# Patient Record
Sex: MALE | Race: WHITE | URBAN - METROPOLITAN AREA
[De-identification: names, ages, dates, MRNs, and addresses within clinical notes are randomized per-mention and may not be internally consistent; named-entity substitution may affect disease eponyms.]

---

## 2018-10-11 PROBLEM — G47.00 INSOMNIA: Status: ACTIVE | Noted: 2018-10-11

## 2018-10-11 PROBLEM — E83.110 HEREDITARY HEMOCHROMATOSIS (HCC): Status: ACTIVE | Noted: 2018-10-11

## 2018-10-11 PROBLEM — M54.50 PAIN, LUMBAR REGION: Status: ACTIVE | Noted: 2018-10-11

## 2018-10-11 PROBLEM — I10 ESSENTIAL HYPERTENSION: Status: ACTIVE | Noted: 2018-10-11

## 2018-10-11 RX ORDER — TOPIRAMATE 50 MG/1
TABLET, FILM COATED ORAL 2 TIMES DAILY
COMMUNITY
End: 2019-10-16 | Stop reason: SDUPTHER

## 2018-10-11 RX ORDER — AMLODIPINE BESYLATE 5 MG/1
5 TABLET ORAL DAILY
COMMUNITY
End: 2018-10-12 | Stop reason: SDUPTHER

## 2018-10-12 ENCOUNTER — OFFICE VISIT (OUTPATIENT)
Dept: FAMILY MEDICINE CLINIC | Age: 33
End: 2018-10-12

## 2018-10-12 VITALS
TEMPERATURE: 97.6 F | OXYGEN SATURATION: 97 % | SYSTOLIC BLOOD PRESSURE: 122 MMHG | DIASTOLIC BLOOD PRESSURE: 82 MMHG | HEART RATE: 75 BPM | BODY MASS INDEX: 36.54 KG/M2 | WEIGHT: 261 LBS | HEIGHT: 71 IN

## 2018-10-12 DIAGNOSIS — E66.9 OBESITY (BMI 30-39.9): ICD-10-CM

## 2018-10-12 DIAGNOSIS — E83.110 HEREDITARY HEMOCHROMATOSIS (HCC): ICD-10-CM

## 2018-10-12 DIAGNOSIS — I10 ESSENTIAL HYPERTENSION: Primary | ICD-10-CM

## 2018-10-12 RX ORDER — AMLODIPINE BESYLATE 5 MG/1
5 TABLET ORAL DAILY
Qty: 90 TAB | Refills: 1 | Status: SHIPPED | OUTPATIENT
Start: 2018-10-12 | End: 2019-04-08 | Stop reason: SDUPTHER

## 2018-10-12 RX ORDER — TOPIRAMATE 25 MG/1
TABLET ORAL
Refills: 2 | COMMUNITY
Start: 2018-09-17

## 2018-10-12 NOTE — PROGRESS NOTES
Maximiliano Tang is a 35 y.o. male Chief Complaint Patient presents with  Blood Pressure Check 1. Have you been to the ER, urgent care clinic since your last visit? Hospitalized since your last visit?  no 
 
2. Have you seen or consulted any other health care providers outside of the [address] [place] [address] since your last visit? Include any pap smears or colon screening.    no

## 2018-10-12 NOTE — MR AVS SNAPSHOT
[address] 
[phone] Patient: Senthil Padilla MRN: [id number] :1985 Visit Information Date & Time Provider Department Dept. Phone Encounter #  
 10/12/2018  3:00 PM Александр Graham MD [facility] 144 [phone] [id number] Follow-up Instructions Return in about 1 month (around 2018). Upcoming Health Maintenance Date Due DTaP/Tdap/Td series (1 - Tdap) 2006 Influenza Age 5 to Adult 2018 Allergies as of 10/12/2018  Never Reviewed Severity Noted Reaction Type Reactions Codeine Phosphate  10/11/2018    Unknown (comments) Erythromycin  10/11/2018    Unknown (comments) Pcn [Penicillins]  10/11/2018    Unknown (comments) Current Immunizations  Never Reviewed No immunizations on file. Not reviewed this visit You Were Diagnosed With   
  
 Codes Comments Essential hypertension    -  Primary ICD-10-CM: I10 
ICD-9-CM: 401.9 Hered[facility]ochrom[facility] ([facility]     ICD-10-CM: E83.110 
ICD-9-CM: 275.01 Vitals BP Pulse Temp Height(growth percentile) Weight(growth percentile) SpO2  
 122/82 (BP 1 Location: Right arm, BP Patient Position: Sitting) 75 97.6 °F (36.4 °C) (Oral) 5' 11\" (1.803 m) 261 lb (118.4 kg) 97% BMI Smoking Status 36.4 kg/m2 Never Smoker BMI and BSA Data Body Mass Index Body Surface Area  
 36.4 kg/m 2 2.44 m 2 Preferred Pharmacy Pharmacy Name Phone [facility] [place] [facility] [phone] Your Updated Medication List  
  
   
This list is accurate as of 10/12/18  3:47 PM.  Always use your most recent med list. amLODIPine 5 mg tablet Commonly known as:  Michaelene Lacy Take 1 Tab by mouth daily. FLONASE NA  
by Nasal route. * topiramate 50 mg tablet Commonly known as:  TOPAMAX Take  by mouth two (2) times a day. * topiramate 25 mg tablet Commonly known as:  TOPAMAX TAKE 1 TABLET IN THE MORNING, 1 TAB AT LUNCH, AND 2 TABS AT BEDTIME  
  
 * Notice: This list has 2 medication(s) that are the same as other medications prescribed for you. Read the directions carefully, and ask your doctor or other care provider to review them with you. Prescriptions Sent to Pharmacy Refills  
 amLODIPine (NORVASC) 5 mg tablet 1 Sig: Take 1 Tab by mouth daily. Class: Normal  
 Pharmacy: [address] [place] [address] #: [phone] Route: Oral  
  
We Performed the Following CBC W/O DIFF [45646 CPT(R)] LIPID PANEL [90224 CPT(R)] METABOLIC PANEL, BASIC [77523 CPT(R)] SLEEP MEDICINE REFERRAL [NDD043 Custom] Comments:  
 Orders: 
Sleep Medicine Consult - Schedule patient for a sleep specialist consult. If appropriate, schedule patient for sleep study(s). Initiate treatment if needed. Forward correspondance to my office. Direct Referral for Study - Please arrange a sleep study consult only if sleep study is positive. Diagnostic polysomnogram - CPT 11625: Split Study if patient meets the criteria. Follow-up Instructions Return in about 1 month (around 11/12/2018). Referral Information Referral ID Referred By Referred To  
  
 [id number] Allie  [address] [facility], MD   
   [address] [address] Phone: [phone] Fax: [phone] Visits Status Start Date End Date 1 New Request 10/12/18 10/12/19 If your referral has a status of pending review or denied, additional information will be sent to support the outcome of this decision. Introducing [facility]! [facility] introduces [employer] patient portal. Now you can access parts of your medical record, email your doctor's office, and request medication refills online. 1. In your internet browser, go to [email or web address] 2. Click on the First Time User? Click Here link in the Sign In box. You will see the New Member Sign Up page. 3. Enter your Algore[employer] Access Code exactly as it appears below. You will not need to use this code after youve completed the sign-up process. If you do not sign up before the expiration date, you must request a new code. · Algore[employer] Access Code: [email or web address] Expires: 1/10/2019  2:56 PM 
 
4. Enter the last four digits of your Social Security Number (xxxx) and Date of Birth (mm/dd/yyyy) as indicated and click Submit. You will be taken to the next sign-up page. 5. Create a Mind[employer] ID. This will be your Algorego login ID and cannot be changed, so think of one that is secure and easy to remember. 6. Create a Algorego password. You can change your password at any time. 7. Enter your Password Reset Question and Answer. This can be used at a later time if you forget your password. 8. Enter your e-mail address. You will receive e-mail notification when new information is available in [address] 9. Click Sign Up. You can now view and download portions of your medical record. 10. Click the Download Summary menu link to download a portable copy of your medical information. If you have questions, please visit the Frequently Asked Questions section of the [employer] website. Remember, Al[employer]ore[employer] is NOT to be used for urgent needs. For medical emergencies, dial 911. Now available from your iPhone and Android! Please provide this summary of care documentation to your next provider. Your primary care clinician is listed as Saud Dickerson. If you have any questions after today's visit, please call [phone].

## 2018-10-12 NOTE — PROGRESS NOTES
[address] [place] [address] Subjective:  
Yulisa Scherer is a 35 y.o. male with history of HTN, Hemochromatosis, Obesity CC: Medication refill History provided by patient and chart review HPI: 
Darcie gentleman who presents to clinic for HTN med refills. Diagnosed with HTN about 6 months and started on Norvasc 5mg. Endorses medication compliance and denies any side effects. Does not check he Blood pressures at home. Working on exercising routinely and improving her diet. Denies any headaches, chest pain, palpitations, vision changes, LE edema, LE edema or orthopnea. Past Medical History:  
Diagnosis Date  Chronic sinusitis Allergies Allergen Reactions  Codeine Phosphate Unknown (comments)  Erythromycin Unknown (comments)  Pcn [Penicillins] Unknown (comments) Current Outpatient Prescriptions on File Prior to Visit Medication Sig Dispense Refill  amLODIPine (NORVASC) 5 mg tablet Take 5 mg by mouth daily.  topiramate (TOPAMAX) 50 mg tablet Take  by mouth two (2) times a day.  fluticasone propionate (FLONASE NA) by Nasal route. No current facility-administered medications on file prior to visit. Family History Problem Relation Age of Onset  No Known Problems Mother  No Known Problems Father  No Known Problems Sister  No Known Problems Brother Social History Social History  Marital status:  Spouse name: N/A  
 Number of children: N/A  
 Years of education: N/A Social History Main Topics  Smoking status: Never Smoker  Smokeless tobacco: Never Used  Alcohol use Yes  Drug use: No  
 Sexual activity: Yes Other Topics Concern  None Social History Narrative  None Past Surgical History:  
Procedure Laterality Date  HX APPENDECTOMY 2/09  HX OTHER SURGICAL Left thumb tendon repair 1/10 Patient Active Problem List  
Diagnosis Code  Essential hypertension I10  
  Hereditary hemochromatosis ([facility] E83.110  
 Insomnia G47.00  
 Pain, lumbar region M54.5 Review of Systems All other systems reviewed and are negative. Objective:  
 
Visit Vitals  /82 (BP 1 Location: Right arm, BP Patient Position: Sitting)  Pulse 75  Temp 97.6 °F (36.4 °C) (Oral)  Ht 5' 11\" (1.803 m)  Wt 261 lb (118.4 kg)  SpO2 97%  BMI 36.4 kg/m2 Physical Exam  
Constitutional: He is oriented to person, place, and time. AOX3, NAD HENT:  
Head: Normocephalic and atraumatic. Eyes: Conjunctivae and EOM are normal. Pupils are equal, round, and reactive to light. No scleral icterus. Neck: Normal range of motion. Neck supple. No JVD present. No thyromegaly present. Cardiovascular: Normal rate, regular rhythm, normal heart sounds and intact distal pulses. Exam reveals no gallop and no friction rub. No murmur heard. Pulmonary/Chest: Effort normal and breath sounds normal. No respiratory distress. Abdominal: Soft. Bowel sounds are normal. He exhibits no distension. There is no tenderness. Musculoskeletal: Normal range of motion. He exhibits no edema, tenderness or deformity. Neurological: He is alert and oriented to person, place, and time. No cranial nerve deficit. Coordination normal.  
Skin: Skin is warm and dry. Pertinent Labs/Studies: 
 
 
Assessment and orders:  
 
Diagnoses and all orders for this visit: 
 
Essential hypertension 
-     CBC W/O DIFF 
-     METABOLIC PANEL, BASIC 
-     amLODIPine (NORVASC) 5 mg tablet; Take 1 Tab by mouth daily. , Normal, Disp-90 Tab, R-1 
-     SLEEP MEDICINE REFERRAL 
-     LIPID PANEL Hereditary hemochromatosis ([facility] 
-     CBC W/O DIFF 
-     Will consider Hematology referral pending CBC Obesity (BMI 30-39.9)  
     -       Counseled on healthy dieting and exercise habits I have reviewed patient medical and social history and medications.   I have reviewed pertinent labs results and other data. I have discussed the diagnosis with the patient and the intended plan as seen in the above orders. The patient has received an after-visit summary and questions were answered concerning future plans. I have discussed medication side effects and warnings with the patient as well. Verito De Luna, MD 
Resident [address] [place] [address] 10/12/18 Patient discussed with Dr. Hong Solomon, Attending Physician

## 2019-04-08 DIAGNOSIS — I10 ESSENTIAL HYPERTENSION: ICD-10-CM

## 2019-04-08 RX ORDER — AMLODIPINE BESYLATE 5 MG/1
5 TABLET ORAL DAILY
Qty: 90 TAB | Refills: 1 | Status: SHIPPED | OUTPATIENT
Start: 2019-04-08 | End: 2019-10-16 | Stop reason: SDUPTHER

## 2019-04-08 NOTE — TELEPHONE ENCOUNTER
LVM for patient informing him his medication has been refilled for 90 days but he is due for an appt before he can get further refills. I asked him to call us to schedule that appt.

## 2019-10-15 PROBLEM — E78.2 MIXED HYPERLIPIDEMIA: Status: ACTIVE | Noted: 2019-10-15

## 2019-10-16 ENCOUNTER — OFFICE VISIT (OUTPATIENT)
Dept: FAMILY MEDICINE CLINIC | Age: 34
End: 2019-10-16

## 2019-10-16 VITALS
SYSTOLIC BLOOD PRESSURE: 123 MMHG | WEIGHT: 217 LBS | HEIGHT: 71 IN | RESPIRATION RATE: 17 BRPM | TEMPERATURE: 97.5 F | BODY MASS INDEX: 30.38 KG/M2 | HEART RATE: 70 BPM | OXYGEN SATURATION: 98 % | DIASTOLIC BLOOD PRESSURE: 77 MMHG

## 2019-10-16 DIAGNOSIS — E78.2 MIXED HYPERLIPIDEMIA: ICD-10-CM

## 2019-10-16 DIAGNOSIS — G47.00 INSOMNIA, UNSPECIFIED TYPE: ICD-10-CM

## 2019-10-16 DIAGNOSIS — E83.110 HEREDITARY HEMOCHROMATOSIS (HCC): ICD-10-CM

## 2019-10-16 DIAGNOSIS — I10 ESSENTIAL HYPERTENSION: Primary | ICD-10-CM

## 2019-10-16 RX ORDER — TRAZODONE HYDROCHLORIDE 50 MG/1
50 TABLET ORAL
Qty: 90 TAB | Refills: 1 | Status: SHIPPED | OUTPATIENT
Start: 2019-10-16 | End: 2019-10-16

## 2019-10-16 RX ORDER — AMLODIPINE BESYLATE 5 MG/1
5 TABLET ORAL DAILY
Qty: 90 TAB | Refills: 1 | Status: SHIPPED | OUTPATIENT
Start: 2019-10-16 | End: 2020-02-11 | Stop reason: SDUPTHER

## 2019-10-16 RX ORDER — TRAZODONE HYDROCHLORIDE 50 MG/1
50 TABLET ORAL
Qty: 90 TAB | Refills: 1 | Status: SHIPPED | OUTPATIENT
Start: 2019-10-16 | End: 2020-02-11 | Stop reason: SDUPTHER

## 2019-10-16 NOTE — PROGRESS NOTES
Alexandru Peralta  29 y.o. male  1985  [id number]    [facility] MEDICINE  Progress Note     Encounter Date: 10/16/2019    Assessment and Plan:     Encounter Diagnoses     ICD-10-CM ICD-9-CM   1. Essential hypertension I10 401.9   2. Mixed hyperlipidemia E78.2 272.2   3. Hereditary hemochromatosis ([facility] E83.110 275.01   4. Insomnia, unspecified type G47.00 780.52       1. Essential hypertension  Controlled. Continue current medication. Check labs. - METABOLIC PANEL, BASIC  - TSH 3RD GENERATION  - amLODIPine (NORVASC) 5 mg tablet; Take 1 Tab by mouth daily. Dispense: 90 Tab; Refill: 1    2. Mixed hyperlipidemia  - LIPID PANEL    3. Hereditary hemochromatosis ([facility]  Patient has both markers. Has been asymptomatic.   - CBC WITH AUTOMATED DIFF  - IRON PROFILE    4. Insomnia, unspecified type  Trial of low-dose trazodone. - traZODone (DESYREL) 50 mg tablet; Take 1 Tab by mouth nightly. Dispense: 90 Tab; Refill: 1      I have discussed the diagnosis with the patient and the intended plan as seen in the above orders. he has expressed understanding. The patient has received an after-visit summary and questions were answered concerning future plans. I have discussed medication side effects and warnings with the patient as well. Electronically Signed: Segundo Christiansen MD    Current Medications after this visit     Current Outpatient Medications   Medication Sig    amLODIPine (NORVASC) 5 mg tablet Take 1 Tab by mouth daily.  traZODone (DESYREL) 50 mg tablet Take 1 Tab by mouth nightly.  topiramate (TOPAMAX) 25 mg tablet TAKE 1 TABLET IN THE MORNING, 1 TAB AT LUNCH, AND 2 TABS AT BEDTIME    fluticasone propionate (FLONASE NA) by Nasal route. No current facility-administered medications for this visit.       Medications Discontinued During This Encounter   Medication Reason    topiramate (TOPAMAX) 50 mg tablet Duplicate Order    amLODIPine (NORVASC) 5 mg tablet Reorder    traZODone (DESYREL) 50 mg tablet Not A Current Medication     ~~~~~~~~~~~~~~~~~~~~~~~~~~~~~~~~~~~~~~~~~~~~~~    Chief Complaint   Patient presents with    Medication Refill     blood pressure       History provided by patient  History of Present Illness   Isidro Ponce is a 29 y.o. male who presents to clinic today for:    Hypertension: Stable   BP Readings from Last 3 Encounters:   10/16/19 123/77   10/12/18 122/82     The patient reports:  taking medications as instructed, no medication side effects noted, no TIA's, no chest pain on exertion, no dyspnea on exertion, no swelling of ankles. Home monitoring:No     Hyperlipidemia:  Stable  Cardiovascular risks for him are: hypertension  hyperlipidemia. Currently he takes no medication   Myalgias: No  No results found for: CHOL, HDL, LDLC, TRIGL, ALT, SGOT, AP        Insomnia   Patient present with cc of insomnia. Patient reports issues following asleep. He has tried Ambien in the past with some success. Has also tried melatonin and ZZquil without success. Denies an snoring or sleep apnea. He works night shifts, 4 days on and 4 days off. Health Maintenance  HM recommendation discussed and ordered with patient's permission. Health Maintenance Due   Topic Date Due    DTaP/Tdap/Td series (1 - Tdap) 02/12/2006     Review of Systems   Review of Systems   Constitutional: Negative for chills and fever. Skin: Negative for itching and rash. Vitals/Objective:     Vitals:    10/16/19 0811   BP: 123/77   Pulse: 70   Resp: 17   Temp: 97.5 °F (36.4 °C)   TempSrc: Oral   SpO2: 98%   Weight: 217 lb (98.4 kg)   Height: 5' 11\" (1.803 m)     Body mass index is 30.27 kg/m². Wt Readings from Last 3 Encounters:   10/16/19 217 lb (98.4 kg)   10/12/18 261 lb (118.4 kg)       Physical Exam   Constitutional: He is oriented to person, place, and time. He appears well-developed and well-nourished. No distress. HENT:   Head: Normocephalic and atraumatic.    Right Ear: External ear normal.   Left Ear: External ear normal.   Mouth/Throat: Oropharynx is clear and moist. No oropharyngeal exudate. Cardiovascular: Normal rate, S1 normal and S2 normal.   No murmur heard. Pulmonary/Chest: Effort normal and breath sounds normal. He has no rales. Musculoskeletal: He exhibits no edema. Neurological: He is alert and oriented to person, place, and time. Skin: Skin is warm and dry. No results found for this or any previous visit (from the past 24 hour(s)). Disposition     Follow-up and Dispositions  ·   Return in about 6 months (around 4/16/2020) for Routine (Chronic Conditions). No future appointments. History   Patient's past medical, surgical and family histories were reviewed and updated. Past Medical History:   Diagnosis Date    Chronic sinusitis      Past Surgical History:   Procedure Laterality Date    HX APPENDECTOMY  02/2009    HX ORTHOPAEDIC Left 01/2010    Thumb tendon repair    HX SHOULDER ARTHROSCOPY Left 2015     Family History   Problem Relation Age of Onset    Hypertension Mother     Deep Vein Thrombosis Mother     Hypertension Father     No Known Problems Sister     Hemachromatosis Brother 29    Coronary Artery Disease Maternal Grandfather     Coronary Artery Disease Paternal Grandfather     Other Paternal Grandfather         Brain tumor     Social History     Tobacco Use    Smoking status: Never Smoker    Smokeless tobacco: Never Used   Substance Use Topics    Alcohol use:  Yes    Drug use: No       Allergies     Allergies   Allergen Reactions    Codeine Phosphate Unknown (comments)    Erythromycin Unknown (comments)    Pcn [Penicillins] Unknown (comments)

## 2019-10-16 NOTE — PROGRESS NOTES
Identified pt with two pt identifiers(name and ). Reviewed record in preparation for visit and have obtained necessary documentation. Chief Complaint   Patient presents with    Medication Refill     blood pressure        Health Maintenance Due   Topic    DTaP/Tdap/Td series (1 - Tdap)    Influenza Age 5 to Adult    - Pt declines flu shot     Coordination of Care Questionnaire:  :   1) Have you been to an emergency room, urgent care, or hospitalized since your last visit? If yes, where when, and reason for visit? no      2. Have seen or consulted any other health care provider since your last visit? If yes, where when, and reason for visit? ENT Dr. Alessandra Kenny         Patient is accompanied by self I have received verbal consent from Erasto Murphy to discuss any/all medical information while they are present in the room.

## 2019-10-17 LAB
BASOPHILS # BLD AUTO: 0 X10E3/UL (ref 0–0.2)
BASOPHILS NFR BLD AUTO: 1 %
BUN SERPL-MCNC: 17 MG/DL (ref 6–20)
BUN/CREAT SERPL: 21 (ref 9–20)
CALCIUM SERPL-MCNC: 10 MG/DL (ref 8.7–10.2)
CHLORIDE SERPL-SCNC: 99 MMOL/L (ref 96–106)
CHOLEST SERPL-MCNC: 169 MG/DL (ref 100–199)
CO2 SERPL-SCNC: 24 MMOL/L (ref 20–29)
CREAT SERPL-MCNC: 0.82 MG/DL (ref 0.76–1.27)
EOSINOPHIL # BLD AUTO: 0.3 X10E3/UL (ref 0–0.4)
EOSINOPHIL NFR BLD AUTO: 5 %
ERYTHROCYTE [DISTWIDTH] IN BLOOD BY AUTOMATED COUNT: 12.3 % (ref 12.3–15.4)
GLUCOSE SERPL-MCNC: 89 MG/DL (ref 65–99)
HCT VFR BLD AUTO: 45.2 % (ref 37.5–51)
HDLC SERPL-MCNC: 43 MG/DL
HGB BLD-MCNC: 15.1 G/DL (ref 13–17.7)
IMM GRANULOCYTES # BLD AUTO: 0 X10E3/UL (ref 0–0.1)
IMM GRANULOCYTES NFR BLD AUTO: 0 %
IRON SATN MFR SERPL: 38 % (ref 15–55)
IRON SERPL-MCNC: 113 UG/DL (ref 38–169)
LDLC SERPL CALC-MCNC: 114 MG/DL (ref 0–99)
LYMPHOCYTES # BLD AUTO: 1.8 X10E3/UL (ref 0.7–3.1)
LYMPHOCYTES NFR BLD AUTO: 33 %
MCH RBC QN AUTO: 29.7 PG (ref 26.6–33)
MCHC RBC AUTO-ENTMCNC: 33.4 G/DL (ref 31.5–35.7)
MCV RBC AUTO: 89 FL (ref 79–97)
MONOCYTES # BLD AUTO: 0.4 X10E3/UL (ref 0.1–0.9)
MONOCYTES NFR BLD AUTO: 7 %
NEUTROPHILS # BLD AUTO: 3 X10E3/UL (ref 1.4–7)
NEUTROPHILS NFR BLD AUTO: 54 %
PLATELET # BLD AUTO: 306 X10E3/UL (ref 150–450)
POTASSIUM SERPL-SCNC: 4.5 MMOL/L (ref 3.5–5.2)
RBC # BLD AUTO: 5.09 X10E6/UL (ref 4.14–5.8)
SODIUM SERPL-SCNC: 141 MMOL/L (ref 134–144)
TIBC SERPL-MCNC: 294 UG/DL (ref 250–450)
TRIGL SERPL-MCNC: 60 MG/DL (ref 0–149)
TSH SERPL DL<=0.005 MIU/L-ACNC: 2.05 UIU/ML (ref 0.45–4.5)
UIBC SERPL-MCNC: 181 UG/DL (ref 111–343)
VLDLC SERPL CALC-MCNC: 12 MG/DL (ref 5–40)
WBC # BLD AUTO: 5.5 X10E3/UL (ref 3.4–10.8)

## 2020-02-11 DIAGNOSIS — I10 ESSENTIAL HYPERTENSION: ICD-10-CM

## 2020-02-11 DIAGNOSIS — G47.00 INSOMNIA, UNSPECIFIED TYPE: ICD-10-CM

## 2020-02-11 RX ORDER — AMLODIPINE BESYLATE 5 MG/1
5 TABLET ORAL DAILY
Qty: 90 TAB | Refills: 0 | Status: SHIPPED | OUTPATIENT
Start: 2020-02-11 | End: 2020-08-04 | Stop reason: SDUPTHER

## 2020-02-11 RX ORDER — TRAZODONE HYDROCHLORIDE 50 MG/1
50 TABLET ORAL
Qty: 90 TAB | Refills: 0 | Status: SHIPPED | OUTPATIENT
Start: 2020-02-11 | End: 2020-08-04 | Stop reason: SDUPTHER

## 2020-08-04 ENCOUNTER — VIRTUAL VISIT (OUTPATIENT)
Dept: FAMILY MEDICINE CLINIC | Age: 35
End: 2020-08-04
Payer: COMMERCIAL

## 2020-08-04 DIAGNOSIS — I10 ESSENTIAL HYPERTENSION: ICD-10-CM

## 2020-08-04 DIAGNOSIS — G47.00 INSOMNIA, UNSPECIFIED TYPE: ICD-10-CM

## 2020-08-04 PROCEDURE — 99213 OFFICE O/P EST LOW 20 MIN: CPT | Performed by: NURSE PRACTITIONER

## 2020-08-04 RX ORDER — AMLODIPINE BESYLATE 5 MG/1
5 TABLET ORAL DAILY
Qty: 90 TAB | Refills: 0 | Status: SHIPPED | OUTPATIENT
Start: 2020-08-04 | End: 2020-11-30

## 2020-08-04 RX ORDER — TRAZODONE HYDROCHLORIDE 50 MG/1
50 TABLET ORAL
Qty: 90 TAB | Refills: 0 | Status: SHIPPED | OUTPATIENT
Start: 2020-08-04 | End: 2020-11-30

## 2020-08-04 NOTE — PROGRESS NOTES
Identified pt with two pt identifiers(name and ). Reviewed record in preparation for visit and have obtained necessary documentation. Chief Complaint   Patient presents with    Medication Refill        Health Maintenance Due   Topic    DTaP/Tdap/Td series (1 - Tdap)    Influenza Age 5 to Adult        There were no vitals taken for this visit. Coordination of Care Questionnaire:  :   1) Have you been to an emergency room, urgent care, or hospitalized since your last visit? NO      2. Have seen or consulted any other health care provider since your last visit? NO          Patient is accompanied by  I have received verbal consent from Castro Benoit to discuss any/all medical information while they are present in the room.

## 2020-08-04 NOTE — PROGRESS NOTES
Miachel Gowers is a 28 y.o. male who was seen by synchronous (real-time) audio-video technology on 8/4/2020 for Medication Refill        Assessment & Plan:   Diagnoses and all orders for this visit:    1. Essential hypertension  -     amLODIPine (NORVASC) 5 mg tablet; Take 1 Tab by mouth daily. Indications: high blood pressure    2. Insomnia, unspecified type  -     traZODone (DESYREL) 50 mg tablet; Take 1 Tab by mouth nightly. Indications: insomnia associated with depression      Orders Placed This Encounter    amLODIPine (NORVASC) 5 mg tablet     Sig: Take 1 Tab by mouth daily. Indications: high blood pressure     Dispense:  90 Tab     Refill:  0    traZODone (DESYREL) 50 mg tablet     Sig: Take 1 Tab by mouth nightly. Indications: insomnia associated with depression     Dispense:  90 Tab     Refill:  0     Patient was seen today by virtual visit for medication refill. The patient was requesting the amlodipine and trazodone. The patient reports taking them as prescribed. The patient reports that his blood pressure has been running 140/82. Advised the patient to take blood pressure when resting for least 5 to 10 minutes feet flat on the floor legs not crossed and not having come from any kind of physical activity. Asked to take when mind is not running also. Patient will keep a blood pressure log and report back if numbers remain elevated. Patient has no chest pain or shortness of breath no other complaints at this time medications were both refilled. I spent at least 15 minutes on this visit with this established patient. Subjective:       Prior to Admission medications    Medication Sig Start Date End Date Taking? Authorizing Provider   amLODIPine (NORVASC) 5 mg tablet Take 1 Tab by mouth daily. Indications: high blood pressure 8/4/20  Yes Monica Castro NP   traZODone (DESYREL) 50 mg tablet Take 1 Tab by mouth nightly.  Indications: insomnia associated with depression 8/4/20  Yes Ninfa Lima H, NP   fluticasone propionate (FLONASE NA) by Nasal route. Yes Provider, Historical   amLODIPine (NORVASC) 5 mg tablet Take 1 Tab by mouth daily. 2/11/20 8/4/20  Brendan Szymanski MD   traZODone (DESYREL) 50 mg tablet Take 1 Tab by mouth nightly. 2/11/20 8/4/20  Brendan Szymanski MD   topiramate (TOPAMAX) 25 mg tablet TAKE 1 TABLET IN THE MORNING, 1 TAB AT LUNCH, AND 2 TABS AT BEDTIME 9/17/18   Franklyn Reyes DO     Patient Active Problem List   Diagnosis Code    Essential hypertension I10    Hereditary hemochromatosis ([facility] E83.110    Insomnia G47.00    Pain, lumbar region M54.5    Mixed hyperlipidemia E78.2     Patient Active Problem List    Diagnosis Date Noted    Mixed hyperlipidemia 10/15/2019    Essential hypertension 10/11/2018    Hereditary hemochromatosis ([facility] 10/11/2018    Insomnia 10/11/2018    Pain, lumbar region 10/11/2018     Current Outpatient Medications   Medication Sig Dispense Refill    amLODIPine (NORVASC) 5 mg tablet Take 1 Tab by mouth daily. Indications: high blood pressure 90 Tab 0    traZODone (DESYREL) 50 mg tablet Take 1 Tab by mouth nightly. Indications: insomnia associated with depression 90 Tab 0    fluticasone propionate (FLONASE NA) by Nasal route.  topiramate (TOPAMAX) 25 mg tablet TAKE 1 TABLET IN THE MORNING, 1 TAB AT LUNCH, AND 2 TABS AT BEDTIME  2     Allergies   Allergen Reactions    Codeine Phosphate Unknown (comments)    Erythromycin Unknown (comments)    Pcn [Penicillins] Unknown (comments)     Past Medical History:   Diagnosis Date    Chronic sinusitis        Review of Systems   Constitutional: Negative. HENT: Negative. Eyes: Negative. Respiratory: Negative. Cardiovascular: Negative. Gastrointestinal: Negative. Genitourinary: Negative. Musculoskeletal: Negative. Skin: Negative. Neurological: Negative. Psychiatric/Behavioral: Negative.         Objective:     Patient-Reported Vitals 8/4/2020   Patient-Reported Weight 205lb   Patient-Reported Height 5ft 11in   Patient-Reported Pulse -   Patient-Reported Systolic  -   Patient-Reported Diastolic -        [INSTRUCTIONS:  \"[x]\" Indicates a positive item  \"[]\" Indicates a negative item  -- DELETE ALL ITEMS NOT EXAMINED]    Constitutional: [x] Appears well-developed and well-nourished [x] No apparent distress      [] Abnormal -     Mental status: [x] Alert and awake  [x] Oriented to person/place/time [x] Able to follow commands    [] Abnormal -     Eyes:   EOM    [x]  Normal    [] Abnormal -   Sclera  [x]  Normal    [] Abnormal -          Discharge [x]  None visible   [] Abnormal -     HENT: [x] Normocephalic, atraumatic  [] Abnormal -   [x] Mouth/Throat: Mucous membranes are moist    External Ears [x] Normal  [] Abnormal -    Neck: [x] No visualized mass [] Abnormal -     Pulmonary/Chest: [x] Respiratory effort normal   [x] No visualized signs of difficulty breathing or respiratory distress        [] Abnormal -      Musculoskeletal:   [x] Normal gait with no signs of ataxia         [x] Normal range of motion of neck        [] Abnormal -     Neurological:        [x] No Facial Asymmetry (Cranial nerve 7 motor function) (limited exam due to video visit)          [x] No gaze palsy        [] Abnormal -          Skin:        [x] No significant exanthematous lesions or discoloration noted on facial skin         [] Abnormal -            Psychiatric:       [x] Normal Affect [] Abnormal -        [x] No Hallucinations    Other pertinent observable physical exam findings:-        We discussed the expected course, resolution and complications of the diagnosis(es) in detail. Medication risks, benefits, costs, interactions, and alternatives were discussed as indicated. I advised him to contact the office if his condition worsens, changes or fails to improve as anticipated. He expressed understanding with the diagnosis(es) and plan.        Markus Cobb, who was evaluated through a patient-initiated, synchronous (real-time) audio-video encounter, and/or his healthcare decision maker, is aware that it is a billable service, with coverage as determined by his insurance carrier. He provided verbal consent to proceed: Yes, and patient identification was verified. It was conducted pursuant to the emergency declaration under the [address] [place] [address], [address] [place] [address] and the [employer] General Act. A caregiver was present when appropriate. Ability to conduct physical exam was limited. I was at home. The patient was at home.       Juan J Mattson NP

## 2020-09-16 ENCOUNTER — VIRTUAL VISIT (OUTPATIENT)
Dept: FAMILY MEDICINE CLINIC | Age: 35
End: 2020-09-16
Payer: COMMERCIAL

## 2020-09-16 DIAGNOSIS — R21 RASH AND NONSPECIFIC SKIN ERUPTION: Primary | ICD-10-CM

## 2020-09-16 PROCEDURE — 99213 OFFICE O/P EST LOW 20 MIN: CPT | Performed by: FAMILY MEDICINE

## 2020-09-16 RX ORDER — TRIAMCINOLONE ACETONIDE 1 MG/G
CREAM TOPICAL 2 TIMES DAILY
Qty: 15 G | Refills: 0 | Status: SHIPPED | OUTPATIENT
Start: 2020-09-16 | End: 2021-01-25

## 2020-09-16 RX ORDER — METHYLPREDNISOLONE 4 MG/1
TABLET ORAL
Qty: 1 DOSE PACK | Refills: 0 | Status: SHIPPED | OUTPATIENT
Start: 2020-09-16

## 2020-09-16 NOTE — PROGRESS NOTES
Identified pt with two pt identifiers(name and ). Reviewed record in preparation for visit and have obtained necessary documentation. Chief Complaint   Patient presents with   [address] Maintenance Due   Topic    DTaP/Tdap/Td series (1 - Tdap)    Flu Vaccine (1)       There were no vitals taken for this visit. Coordination of Care Questionnaire:  :   1) Have you been to an emergency room, urgent care, or hospitalized since your last visit? NO      2. Have seen or consulted any other health care provider since your last visit? NO          Patient is accompanied by  I have received verbal consent from Jennifer Gomez to discuss any/all medical information while they are present in the room.

## 2020-09-16 NOTE — PROGRESS NOTES
Consent: Markus Cobb, who was seen by synchronous (real-time) audio-video technology, and/or his healthcare decision maker, is aware that this patient-initiated, Telehealth encounter on 9/16/2020 is a billable service, with coverage as determined by his insurance carrier. He is aware that he may receive a bill and has provided verbal consent to proceed: Yes. Assessment & Plan:   Diagnoses and all orders for this visit:    1. Rash and nonspecific skin eruption  -     methylPREDNISolone (MEDROL DOSEPACK) 4 mg tablet; Take as directed on the pack  -     triamcinolone acetonide (KENALOG) 0.1 % topical cream; Apply  to affected area two (2) times a day. use thin layer      Rash does not seem to be specific for shingles since it is nontender and patient does not have any other associated symptoms. Rash is not dermatomal.  We discussed to try steroids first as it could be an allergic rash, if no improvement will start antiviral medication. Follow-up and Dispositions    · Return in about 2 weeks (around 9/30/2020), or if symptoms worsen or fail to improve. I ADVISED PATIENT TO GO TO ER IF SYMPTOMS WORSEN , CHANGE OR FAILS TO IMPROVE. I spent at least 15 minutes with this established patient, and >50% of the time was spent counseling and/or coordinating care regarding SEE BELOW  [phone]  Subjective:   Markus Cobb is a 28 y.o. male who was seen for Shingles      1. Rash and nonspecific skin eruption        is a 28 y.o. male who notes recent onset of a skin problem. The details are as follows:      ONSET: Yesterday, patient reports his wife was recently diagnosed with shingles and is taking prednisone and antiviral medication. LOCATION: Right mid and lower back, left mid back and right arm    TRIGGER: No obvious trigger    PREVIOUS TREATMENTS: Taking Allegra for seasonal allergies    RASH DESCRIPTION: Erythematous, pruritic. Nontender     Patient also noted that OTC remedies did not help. Denies fever        Prior to Admission medications    Medication Sig Start Date End Date Taking? Authorizing Provider   methylPREDNISolone (MEDROL DOSEPACK) 4 mg tablet Take as directed on the pack 9/16/20  Yes Bridget Walters MD   triamcinolone acetonide (KENALOG) 0.1 % topical cream Apply  to affected area two (2) times a day. use thin layer 9/16/20  Yes Bridget Walters MD   amLODIPine (NORVASC) 5 mg tablet Take 1 Tab by mouth daily. Indications: high blood pressure 8/4/20  Yes Soco Rodriguez NP   traZODone (DESYREL) 50 mg tablet Take 1 Tab by mouth nightly. Indications: insomnia associated with depression 8/4/20  Yes Soco Rodriguez NP   topiramate (TOPAMAX) 25 mg tablet TAKE 1 TABLET IN THE MORNING, 1 TAB AT LUNCH, AND 2 TABS AT BEDTIME 9/17/18  Yes Mark Anthony Virgen, DO   fluticasone propionate (FLONASE NA) by Nasal route. Yes Provider, Historical     Allergies   Allergen Reactions    Codeine Phosphate Unknown (comments)    Erythromycin Unknown (comments)    Pcn [Penicillins] Unknown (comments)       Patient Active Problem List   Diagnosis Code    Essential hypertension I10    Hereditary hemochromatosis ([address] E83.110    Insomnia G47.00    Pain, lumbar region M54.5    Mixed hyperlipidemia E78.2     Patient Active Problem List    Diagnosis Date Noted    Mixed hyperlipidemia 10/15/2019    Essential hypertension 10/11/2018    Hereditary hemochromatosis ([address] 10/11/2018    Insomnia 10/11/2018    Pain, lumbar region 10/11/2018     Current Outpatient Medications   Medication Sig Dispense Refill    methylPREDNISolone (MEDROL DOSEPACK) 4 mg tablet Take as directed on the pack 1 Dose Pack 0    triamcinolone acetonide (KENALOG) 0.1 % topical cream Apply  to affected area two (2) times a day. use thin layer 15 g 0    amLODIPine (NORVASC) 5 mg tablet Take 1 Tab by mouth daily. Indications: high blood pressure 90 Tab 0    traZODone (DESYREL) 50 mg tablet Take 1 Tab by mouth nightly.  Indications: insomnia associated with depression 90 Tab 0    topiramate (TOPAMAX) 25 mg tablet TAKE 1 TABLET IN THE MORNING, 1 TAB AT LUNCH, AND 2 TABS AT BEDTIME  2    fluticasone propionate (FLONASE NA) by Nasal route. Allergies   Allergen Reactions    Codeine Phosphate Unknown (comments)    Erythromycin Unknown (comments)    Pcn [Penicillins] Unknown (comments)     Past Medical History:   Diagnosis Date    Chronic sinusitis      Past Surgical History:   Procedure Laterality Date    HX APPENDECTOMY  02/2009    HX ORTHOPAEDIC Left 01/2010    Thumb tendon repair    HX SHOULDER ARTHROSCOPY Left 2015     Family History   Problem Relation Age of Onset    Hypertension Mother     Deep Vein Thrombosis Mother     Hypertension Father     No Known Problems Sister     Hemachromatosis Brother 29    Coronary Artery Disease Maternal Grandfather     Coronary Artery Disease Paternal Grandfather     Other Paternal Grandfather         Brain tumor     Social History     Tobacco Use    Smoking status: Never Smoker    Smokeless tobacco: Never Used   Substance Use Topics    Alcohol use: Yes       Review of Systems   Constitutional: Negative. HENT: Negative. Eyes: Negative. Respiratory: Negative. Cardiovascular: Negative. Gastrointestinal: Negative. Genitourinary: Negative. Musculoskeletal: Negative. Skin: Positive for itching and rash. Neurological: Negative. Endo/Heme/Allergies: Negative. Psychiatric/Behavioral: Negative.             Objective:     Patient-Reported Vitals 9/16/2020   Patient-Reported Weight 198lb   Patient-Reported Height -   Patient-Reported Pulse -   Patient-Reported Systolic  -   Patient-Reported Diastolic -        [INSTRUCTIONS:  \"[x]\" Indicates a positive item  \"[]\" Indicates a negative item  -- DELETE ALL ITEMS NOT EXAMINED]    Constitutional: [x] Appears well-developed and well-nourished [x] No apparent distress      [] Abnormal -     Mental status: [x] Alert and awake  [x] Oriented to person/place/time [x] Able to follow commands    [] Abnormal -     Eyes:   EOM    [x]  Normal    [] Abnormal -   Sclera  [x]  Normal    [] Abnormal -          Discharge [x]  None visible   [] Abnormal -     HENT: [x] Normocephalic, atraumatic  [] Abnormal -   [x] Mouth/Throat: Mucous membranes are moist    External Ears [x] Normal  [] Abnormal -    Neck: [x] No visualized mass [] Abnormal -     Pulmonary/Chest: [x] Respiratory effort normal   [x] No visualized signs of difficulty breathing or respiratory distress        [] Abnormal -      Musculoskeletal:   [x] Normal gait with no signs of ataxia         [x] Normal range of motion of neck        [] Abnormal -     Neurological:        [x] No Facial Asymmetry (Cranial nerve 7 motor function) (limited exam due to video visit)          [x] No gaze palsy        [] Abnormal -          Skin:        [] No significant exanthematous lesions or discoloration noted on facial skin         [x] Abnormal -erythematous macular rash noted on right side of the back, arm and left side back          Psychiatric:       [x] Normal Affect [] Abnormal -        [x] No Hallucinations    Other pertinent observable physical exam findings:-    We discussed the expected course, resolution and complications of the diagnosis(es) in detail. Medication risks, benefits, costs, interactions, and alternatives were discussed as indicated. I advised him to contact the office if his condition worsens, changes or fails to improve as anticipated. He expressed understanding with the diagnosis(es) and plan. Bennie Jerez is a 28 y.o. male being evaluated by a video visit encounter for concerns as above. A caregiver was present when appropriate. Due to this being a TeleHealth encounter (During [facility] public health emergency), evaluation of the following organ systems was limited: Vitals/Constitutional/EENT/Resp/CV/GI//MS/Neuro/Skin/Heme-Lymph-Imm.   Pursuant to the emergency declaration under the [facility], [facility] waiver authority and the [employer] and [employer] Act, this Virtual  Visit was conducted, with patient's (and/or legal guardian's) consent, to reduce the patient's risk of exposure to COVID-19 and provide necessary medical care. Services were provided through a video synchronous discussion virtually to substitute for in-person clinic visit. Patient was located at his home. I was at office while conducting this encounter.        Lupis Miguel MD

## 2020-11-28 DIAGNOSIS — I10 ESSENTIAL HYPERTENSION: ICD-10-CM

## 2020-11-28 DIAGNOSIS — G47.00 INSOMNIA, UNSPECIFIED TYPE: ICD-10-CM

## 2020-11-30 RX ORDER — TRAZODONE HYDROCHLORIDE 50 MG/1
TABLET ORAL
Qty: 90 TAB | Refills: 0 | Status: SHIPPED | OUTPATIENT
Start: 2020-11-30 | End: 2021-02-22

## 2020-11-30 RX ORDER — AMLODIPINE BESYLATE 5 MG/1
TABLET ORAL
Qty: 90 TAB | Refills: 0 | Status: SHIPPED | OUTPATIENT
Start: 2020-11-30 | End: 2021-02-22

## 2021-01-22 DIAGNOSIS — R21 RASH AND NONSPECIFIC SKIN ERUPTION: ICD-10-CM

## 2021-01-25 RX ORDER — TRIAMCINOLONE ACETONIDE 1 MG/G
CREAM TOPICAL 2 TIMES DAILY
Qty: 15 G | Refills: 0 | Status: SHIPPED | OUTPATIENT
Start: 2021-01-25

## 2021-02-21 DIAGNOSIS — I10 ESSENTIAL HYPERTENSION: ICD-10-CM

## 2021-02-21 DIAGNOSIS — G47.00 INSOMNIA, UNSPECIFIED TYPE: ICD-10-CM

## 2021-02-22 RX ORDER — TRAZODONE HYDROCHLORIDE 50 MG/1
TABLET ORAL
Qty: 90 TAB | Refills: 0 | Status: SHIPPED | OUTPATIENT
Start: 2021-02-22 | End: 2021-05-19

## 2021-02-22 RX ORDER — AMLODIPINE BESYLATE 5 MG/1
TABLET ORAL
Qty: 90 TAB | Refills: 0 | Status: SHIPPED | OUTPATIENT
Start: 2021-02-22 | End: 2021-05-26

## 2021-05-19 DIAGNOSIS — G47.00 INSOMNIA, UNSPECIFIED TYPE: ICD-10-CM

## 2021-05-19 RX ORDER — TRAZODONE HYDROCHLORIDE 50 MG/1
TABLET ORAL
Qty: 90 TABLET | Refills: 0 | Status: SHIPPED | OUTPATIENT
Start: 2021-05-19 | End: 2021-08-16

## 2021-05-26 DIAGNOSIS — I10 ESSENTIAL HYPERTENSION: ICD-10-CM

## 2021-05-26 RX ORDER — AMLODIPINE BESYLATE 5 MG/1
TABLET ORAL
Qty: 90 TABLET | Refills: 0 | Status: SHIPPED | OUTPATIENT
Start: 2021-05-26 | End: 2021-08-20

## 2021-08-16 DIAGNOSIS — G47.00 INSOMNIA, UNSPECIFIED TYPE: ICD-10-CM

## 2021-08-16 RX ORDER — TRAZODONE HYDROCHLORIDE 50 MG/1
TABLET ORAL
Qty: 90 TABLET | Refills: 0 | Status: SHIPPED | OUTPATIENT
Start: 2021-08-16 | End: 2021-11-18

## 2021-08-18 DIAGNOSIS — I10 ESSENTIAL HYPERTENSION: ICD-10-CM

## 2021-08-20 RX ORDER — AMLODIPINE BESYLATE 5 MG/1
TABLET ORAL
Qty: 90 TABLET | Refills: 0 | Status: SHIPPED | OUTPATIENT
Start: 2021-08-20

## 2021-11-13 DIAGNOSIS — G47.00 INSOMNIA, UNSPECIFIED TYPE: ICD-10-CM

## 2021-11-18 RX ORDER — TRAZODONE HYDROCHLORIDE 50 MG/1
TABLET ORAL
Qty: 90 TABLET | Refills: 0 | Status: SHIPPED | OUTPATIENT
Start: 2021-11-18 | End: 2022-02-14

## 2022-03-18 PROBLEM — G47.00 INSOMNIA: Status: ACTIVE | Noted: 2018-10-11

## 2022-03-18 PROBLEM — M54.50 PAIN, LUMBAR REGION: Status: ACTIVE | Noted: 2018-10-11

## 2022-03-18 PROBLEM — I10 ESSENTIAL HYPERTENSION: Status: ACTIVE | Noted: 2018-10-11

## 2022-03-19 PROBLEM — E83.110 HEREDITARY HEMOCHROMATOSIS (HCC): Status: ACTIVE | Noted: 2018-10-11

## 2022-03-20 PROBLEM — E78.2 MIXED HYPERLIPIDEMIA: Status: ACTIVE | Noted: 2019-10-15

## 2022-04-11 DIAGNOSIS — G47.00 INSOMNIA, UNSPECIFIED TYPE: ICD-10-CM

## 2022-04-11 RX ORDER — TRAZODONE HYDROCHLORIDE 50 MG/1
TABLET ORAL
Qty: 60 TABLET | Refills: 0 | OUTPATIENT
Start: 2022-04-11

## 2022-04-11 NOTE — TELEPHONE ENCOUNTER
PCP: Leif Santos NP    Last appt: 9/16/2020  No future appointments.     Requested Prescriptions     Pending Prescriptions Disp Refills    traZODone (DESYREL) 50 mg tablet 60 Tablet 0       Prior labs and Blood pressures:  BP Readings from Last 3 Encounters:   10/16/19 123/77   10/12/18 122/82     Lab Results   Component Value Date/Time    Sodium 141 10/16/2019 08:38 AM    Potassium 4.5 10/16/2019 08:38 AM    Chloride 99 10/16/2019 08:38 AM    CO2 24 10/16/2019 08:38 AM    Glucose 89 10/16/2019 08:38 AM    BUN 17 10/16/2019 08:38 AM    Creatinine 0.82 10/16/2019 08:38 AM    BUN/Creatinine ratio 21 (H) 10/16/2019 08:38 AM    GFR est  10/16/2019 08:38 AM    GFR est non- 10/16/2019 08:38 AM    Calcium 10.0 10/16/2019 08:38 AM     No results found for: HBA1C, UPY2AAYB, [id number]  Lab Results   Component Value Date/Time    Cholesterol, total 169 10/16/2019 08:38 AM    HDL Cholesterol 43 10/16/2019 08:38 AM    LDL, calculated 114 (H) 10/16/2019 08:38 AM    VLDL, calculated 12 10/16/2019 08:38 AM    Triglyceride 60 10/16/2019 08:38 AM     No results found for: BAO Conley    Lab Results   Component Value Date/Time    TSH 2.050 10/16/2019 08:38 AM

## 2022-04-13 DIAGNOSIS — G47.00 INSOMNIA, UNSPECIFIED TYPE: ICD-10-CM

## 2022-04-13 RX ORDER — TRAZODONE HYDROCHLORIDE 50 MG/1
TABLET ORAL
Qty: 60 TABLET | Refills: 0 | OUTPATIENT
Start: 2022-04-13

## 2022-04-13 NOTE — TELEPHONE ENCOUNTER
Please advice! !    .  PCP: Chloé Velazquez NP    Last appt: 9/16/2020  No future appointments.     Requested Prescriptions      No prescriptions requested or ordered in this encounter       Prior labs and Blood pressures:  BP Readings from Last 3 Encounters:   10/16/19 123/77   10/12/18 122/82     Lab Results   Component Value Date/Time    Sodium 141 10/16/2019 08:38 AM    Potassium 4.5 10/16/2019 08:38 AM    Chloride 99 10/16/2019 08:38 AM    CO2 24 10/16/2019 08:38 AM    Glucose 89 10/16/2019 08:38 AM    BUN 17 10/16/2019 08:38 AM    Creatinine 0.82 10/16/2019 08:38 AM    BUN/Creatinine ratio 21 (H) 10/16/2019 08:38 AM    GFR est  10/16/2019 08:38 AM    GFR est non- 10/16/2019 08:38 AM    Calcium 10.0 10/16/2019 08:38 AM     No results found for: HBA1C, [id number], [id number]  Lab Results   Component Value Date/Time    Cholesterol, total 169 10/16/2019 08:38 AM    HDL Cholesterol 43 10/16/2019 08:38 AM    LDL, calculated 114 (H) 10/16/2019 08:38 AM    VLDL, calculated 12 10/16/2019 08:38 AM    Triglyceride 60 10/16/2019 08:38 AM     No results found for: BAO Syed    Lab Results   Component Value Date/Time    TSH 2.050 10/16/2019 08:38 AM